# Patient Record
Sex: MALE | Race: WHITE | Employment: PART TIME | URBAN - METROPOLITAN AREA
[De-identification: names, ages, dates, MRNs, and addresses within clinical notes are randomized per-mention and may not be internally consistent; named-entity substitution may affect disease eponyms.]

---

## 2021-07-09 ENCOUNTER — HOSPITAL ENCOUNTER (EMERGENCY)
Age: 51
Discharge: HOME | End: 2021-07-09
Payer: COMMERCIAL

## 2021-07-09 ENCOUNTER — HOSPITAL ENCOUNTER (EMERGENCY)
Age: 51
Discharge: LEFT BEFORE DISCHARGE | End: 2021-07-09
Payer: COMMERCIAL

## 2021-07-09 VITALS
RESPIRATION RATE: 16 BRPM | SYSTOLIC BLOOD PRESSURE: 151 MMHG | DIASTOLIC BLOOD PRESSURE: 84 MMHG | TEMPERATURE: 97.2 F | BODY MASS INDEX: 26.52 KG/M2 | HEART RATE: 76 BPM | WEIGHT: 175 LBS | OXYGEN SATURATION: 99 % | HEIGHT: 68 IN

## 2021-07-09 VITALS
DIASTOLIC BLOOD PRESSURE: 78 MMHG | HEIGHT: 68 IN | OXYGEN SATURATION: 97 % | TEMPERATURE: 98.8 F | SYSTOLIC BLOOD PRESSURE: 136 MMHG | RESPIRATION RATE: 16 BRPM | WEIGHT: 175 LBS | HEART RATE: 86 BPM | BODY MASS INDEX: 26.52 KG/M2
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
## 2021-07-09 DIAGNOSIS — R03.0 ELEVATED BLOOD PRESSURE READING: ICD-10-CM

## 2021-07-09 PROCEDURE — 99282 EMERGENCY DEPT VISIT SF MDM: CPT

## 2021-07-09 PROCEDURE — 87390 HIV-1 AG IA: CPT

## 2021-07-09 PROCEDURE — 86704 HEP B CORE ANTIBODY TOTAL: CPT

## 2021-07-09 PROCEDURE — 86702 HIV-2 ANTIBODY: CPT

## 2021-07-09 PROCEDURE — 90471 IMMUNIZATION ADMIN: CPT | Performed by: PHYSICIAN ASSISTANT

## 2021-07-09 PROCEDURE — 90740 HEPB VACC 3 DOSE IMMUNSUP IM: CPT | Performed by: PHYSICIAN ASSISTANT

## 2021-07-09 PROCEDURE — 86701 HIV-1ANTIBODY: CPT

## 2021-07-09 PROCEDURE — 6360000002 HC RX W HCPCS: Performed by: PHYSICIAN ASSISTANT

## 2021-07-09 PROCEDURE — 96372 THER/PROPH/DIAG INJ SC/IM: CPT

## 2021-07-09 PROCEDURE — G0010 ADMIN HEPATITIS B VACCINE: HCPCS | Performed by: PHYSICIAN ASSISTANT

## 2021-07-09 PROCEDURE — 87340 HEPATITIS B SURFACE AG IA: CPT

## 2021-07-09 PROCEDURE — 86706 HEP B SURFACE ANTIBODY: CPT

## 2021-07-09 PROCEDURE — 86803 HEPATITIS C AB TEST: CPT

## 2021-07-09 PROCEDURE — 90715 TDAP VACCINE 7 YRS/> IM: CPT | Performed by: PHYSICIAN ASSISTANT

## 2021-07-09 RX ADMIN — TETANUS TOXOID, REDUCED DIPHTHERIA TOXOID AND ACELLULAR PERTUSSIS VACCINE, ADSORBED 0.5 ML: 5; 2.5; 8; 8; 2.5 SUSPENSION INTRAMUSCULAR at 20:03

## 2021-07-09 RX ADMIN — HEPATITIS B VACCINE (RECOMBINANT) 10 MCG: 20 INJECTION, SUSPENSION INTRAMUSCULAR at 20:04

## 2021-07-09 ASSESSMENT — PAIN DESCRIPTION - PAIN TYPE: TYPE: ACUTE PAIN

## 2021-07-09 ASSESSMENT — PAIN DESCRIPTION - ORIENTATION: ORIENTATION: LEFT

## 2021-07-09 ASSESSMENT — PAIN SCALES - GENERAL: PAINLEVEL_OUTOF10: 1

## 2021-07-09 ASSESSMENT — PAIN DESCRIPTION - LOCATION: LOCATION: FINGER (COMMENT WHICH ONE)

## 2021-07-09 NOTE — ED NOTES
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

## 2021-07-09 NOTE — ED PROVIDER NOTES
[address] ED  EMERGENCY DEPARTMENT ENCOUNTER        Pt Name: Suzy Thomason  MRN: [id number]  [place] 1970  Date of evaluation: 7/9/2021  Provider: Latonia Joshi PA-C  PCP: No primary care provider on file. Shared Visit or Autonomous Visit: REBEKAH. I have evaluated this patient. My supervising physician was available for consultation. CHIEF COMPLAINT       Chief Complaint   Patient presents with    Body Fluid Exposure     Pt reports sustained accidental needle exposure while working at [employer]. Pt reports left [facility] due to wait time. HISTORY OF PRESENT ILLNESS   (Location/Symptom, Timing/Onset, Context/Setting, Quality, Duration, Modifying Factors, Severity)  Note limiting factors. Suzy Thomason is a 49 y.o. male presenting to the emergency department for evaluation of needlestick injury. He was working at the [employer] when he lifted something up and was accidentally poked into the left index finger he had 2 pairs of gloves on at the time went through his finger pad. Did not see blood on the needle. Unsure how long the needle was there but believes things had been there for several months. Injury occurred at 1 PM today. He went to [facility] states he waited in the lobby for hours before leaving and came here. Needs tetanus updated. States he had hepatitis B vaccines many years ago. Concern for infection. The history is provided by the patient. Hand Problem  Location:  Finger  Finger location:  L index finger  Injury: yes    Pain details:     Onset quality:  Sudden  Foreign body present:  No foreign bodies  Tetanus status:  Out of date  Associated symptoms: no decreased range of motion, no fever and no numbness          Nursing Notes were reviewed    REVIEW OF SYSTEMS    (2-9 systems for level 4, 10 or more for level 5)     Review of Systems   Constitutional: Negative for fever. Skin: Positive for wound.  Negative for color change. Neurological: Negative for weakness and numbness. Positives and Pertinent negatives as per HPI. PAST MEDICAL HISTORY   History reviewed. No pertinent past medical history. SURGICAL HISTORY   History reviewed. No pertinent surgical history. CURRENTMEDICATIONS       There are no discharge medications for this patient. ALLERGIES     Patient has no known allergies. FAMILYHISTORY     History reviewed. No pertinent family history. SOCIAL HISTORY       Social History     Socioeconomic History    Marital status:      Spouse name: None    Number of children: None    Years of education: None    Highest education level: None   Occupational History    None   Tobacco Use    Smoking status: Current Every Day Smoker     Types: Cigarettes    Smokeless tobacco: Never Used   Substance and Sexual Activity    Alcohol use: Not Currently    Drug use: Yes     Types: Marijuana    Sexual activity: None   Other Topics Concern    None   Social History Narrative    None     Social Determinants of Health     Financial Resource Strain:     Difficulty of Paying Living Expenses:    Food Insecurity:     Worried About Running Out of Food in the Last Year:     Ran Out of Food in the Last Year:    Transportation Needs:     Lack of Transportation (Medical):      Lack of Transportation (Non-Medical):    Physical Activity:     Days of Exercise per Week:     Minutes of Exercise per Session:    Stress:     Feeling of Stress :    Social Connections:     Frequency of Communication with Friends and Family:     Frequency of Social Gatherings with Friends and Family:     Attends Rastafarian Services:     Active Member of Clubs or Organizations:     Attends Club or Organization Meetings:     Marital Status:    Intimate Partner Violence:     Fear of Current or Ex-Partner:     Emotionally Abused:     Physically Abused:     Sexually Abused:        SCREENINGS             PHYSICAL EXAM (up to 7 for level 4, 8 or more for level 5)     ED Triage Vitals [07/09/21 1857]   BP Temp Temp Source Pulse Resp SpO2 Height Weight   (!) 144/95 97.2 °F (36.2 °C) Oral 78 16 98 % 5' 8\" (1.727 m) 175 lb (79.4 kg)       Physical Exam  Vitals and nursing note reviewed. Constitutional:       Appearance: He is well-developed. He is not ill-appearing or toxic-appearing. HENT:      Head: Normocephalic and atraumatic. Cardiovascular:      Pulses: Normal pulses. Pulmonary:      Effort: Pulmonary effort is normal.   Musculoskeletal:      Comments: Tiny puncture wound left index finger, no active bleeding, no redness or swelling, intact sensation, cap refill less 2 sec. Skin:     General: Skin is warm and dry. Capillary Refill: Capillary refill takes less than 2 seconds. Neurological:      Mental Status: He is alert and oriented to person, place, and time. Sensory: Sensation is intact. Motor: Motor function is intact. No abnormal muscle tone. Psychiatric:         Behavior: Behavior normal.         DIAGNOSTIC RESULTS   LABS:    Labs Reviewed   HEPATITIS B SURFACE ANTIBODY CLIENT   HEPATITIS C ANTIBODY CLIENT   HIV SIGNIFICANT EXPOSURE CLIENT   HEPATITIS B SURFACE ANTIGEN   HEPATITIS B CORE ANTIBODY, TOTAL       All other labs were within normal range or not returned as of this dictation. EKG: All EKG's are interpreted by the Emergency Department Physician in the absence of a cardiologist.  Please see their note for interpretation of EKG. RADIOLOGY:   Non-plain film images such as CT, Ultrasound and MRI are read by the radiologist. Plain radiographic images are visualized andpreliminarily interpreted by the  ED Provider with the below findings:        Interpretation [facility] Radiologist below, if available at the time of this note:    No orders to display     No results found.         PROCEDURES   Unless otherwise noted below, none     Procedures    CRITICAL CARE TIME medications for this patient. DISCONTINUED MEDICATIONS:  There are no discharge medications for this patient.              (Please note that portions ofthis note were completed with a voice recognition program.  Efforts were made to edit the dictations but occasionally words are mis-transcribed.)    Aleah Perez PA-C (electronically signed)            Meghana Umanzor PA-C  07/10/21 8063

## 2021-07-09 NOTE — ED NOTES
Bed: T2  Expected date:   Expected time:   Means of arrival:   Comments:  Body Fluid Exposure PT     Shirlene Jefferson RN  07/09/21 5890

## 2021-07-09 NOTE — LETTER
[facility] [place] [facility] ED  [address] [address]  Phone: [phone]               July 13, 2021    Patient: Violeta Chakraborty   YOB: 1970   Date of Visit: 7/9/2021       To Whom It May Concern:    Violeta Chakrbaorty was seen and treated in our emergency department on 7/9/2021. He may return to work on 7/9/2021.       Sincerely,       Rajinder Malik PA-C         Signature:__________________________________

## 2021-07-10 LAB
HBV SURFACE AB TITR SER: <3.5 MIU/ML
HEPATITIS B SURFACE ANTIGEN INTERPRETATION: NORMAL
HEPATITIS C ANTIBODY: NORMAL

## 2021-07-10 ASSESSMENT — ENCOUNTER SYMPTOMS: COLOR CHANGE: 0

## 2021-07-11 LAB
HEPATITIS B CORE TOTAL ANTIBODY: NEGATIVE
HIV AG/AB: NORMAL
HIV ANTIGEN: NORMAL
HIV-1 ANTIBODY: NORMAL
HIV-2 AB: NORMAL

## 2021-09-08 ENCOUNTER — HOSPITAL ENCOUNTER (EMERGENCY)
Age: 51
Discharge: HOME | End: 2021-09-08
Attending: STUDENT IN AN ORGANIZED HEALTH CARE EDUCATION/TRAINING PROGRAM
Payer: COMMERCIAL

## 2021-09-08 VITALS
DIASTOLIC BLOOD PRESSURE: 95 MMHG | OXYGEN SATURATION: 97 % | HEART RATE: 84 BPM | TEMPERATURE: 98.2 F | RESPIRATION RATE: 16 BRPM | SYSTOLIC BLOOD PRESSURE: 138 MMHG

## 2021-09-08 DIAGNOSIS — H10.9 CONJUNCTIVITIS OF LEFT EYE, UNSPECIFIED CONJUNCTIVITIS TYPE: ICD-10-CM

## 2021-09-08 DIAGNOSIS — Z00.8 ENCOUNTER FOR PSYCHOLOGICAL EVALUATION: Primary | ICD-10-CM

## 2021-09-08 LAB
A/G RATIO: 1.3 (ref 1.1–2.2)
ACETAMINOPHEN LEVEL: <5 UG/ML (ref 10–30)
ALBUMIN SERPL-MCNC: 4.4 G/DL (ref 3.4–5)
ALP BLD-CCNC: 84 U/L (ref 40–129)
ALT SERPL-CCNC: 10 U/L (ref 10–40)
AMPHETAMINE SCREEN, URINE: ABNORMAL
ANION GAP SERPL CALCULATED.3IONS-SCNC: 13 MMOL/L (ref 3–16)
AST SERPL-CCNC: 13 U/L (ref 15–37)
BARBITURATE SCREEN URINE: ABNORMAL
BASOPHILS ABSOLUTE: 0.1 K/UL (ref 0–0.2)
BASOPHILS RELATIVE PERCENT: 0.5 %
BENZODIAZEPINE SCREEN, URINE: ABNORMAL
BILIRUB SERPL-MCNC: 0.3 MG/DL (ref 0–1)
BUN BLDV-MCNC: 14 MG/DL (ref 7–20)
CALCIUM SERPL-MCNC: 9.7 MG/DL (ref 8.3–10.6)
CANNABINOID SCREEN URINE: POSITIVE
CHLORIDE BLD-SCNC: 103 MMOL/L (ref 99–110)
CO2: 24 MMOL/L (ref 21–32)
COCAINE METABOLITE SCREEN URINE: ABNORMAL
CREAT SERPL-MCNC: 1 MG/DL (ref 0.9–1.3)
EOSINOPHILS ABSOLUTE: 0 K/UL (ref 0–0.6)
EOSINOPHILS RELATIVE PERCENT: 0.2 %
ETHANOL: NORMAL MG/DL (ref 0–0.08)
GFR AFRICAN AMERICAN: >60
GFR NON-AFRICAN AMERICAN: >60
GLOBULIN: 3.3 G/DL
GLUCOSE BLD-MCNC: 95 MG/DL (ref 70–99)
HCT VFR BLD CALC: 47.3 % (ref 40.5–52.5)
HEMOGLOBIN: 16.3 G/DL (ref 13.5–17.5)
LYMPHOCYTES ABSOLUTE: 1.9 K/UL (ref 1–5.1)
LYMPHOCYTES RELATIVE PERCENT: 17.6 %
Lab: ABNORMAL
MCH RBC QN AUTO: 32.7 PG (ref 26–34)
MCHC RBC AUTO-ENTMCNC: 34.4 G/DL (ref 31–36)
MCV RBC AUTO: 95.1 FL (ref 80–100)
METHADONE SCREEN, URINE: ABNORMAL
MONOCYTES ABSOLUTE: 0.7 K/UL (ref 0–1.3)
MONOCYTES RELATIVE PERCENT: 6.2 %
NEUTROPHILS ABSOLUTE: 8.1 K/UL (ref 1.7–7.7)
NEUTROPHILS RELATIVE PERCENT: 75.5 %
OPIATE SCREEN URINE: ABNORMAL
OXYCODONE URINE: ABNORMAL
PDW BLD-RTO: 13.9 % (ref 12.4–15.4)
PH UA: 5
PHENCYCLIDINE SCREEN URINE: ABNORMAL
PLATELET # BLD: 235 K/UL (ref 135–450)
PMV BLD AUTO: 7.4 FL (ref 5–10.5)
POTASSIUM REFLEX MAGNESIUM: 4.2 MMOL/L (ref 3.5–5.1)
PROPOXYPHENE SCREEN: ABNORMAL
RBC # BLD: 4.97 M/UL (ref 4.2–5.9)
SALICYLATE, SERUM: <0.3 MG/DL (ref 15–30)
SARS-COV-2, NAAT: NOT DETECTED
SODIUM BLD-SCNC: 140 MMOL/L (ref 136–145)
TOTAL PROTEIN: 7.7 G/DL (ref 6.4–8.2)
WBC # BLD: 10.7 K/UL (ref 4–11)

## 2021-09-08 PROCEDURE — 87635 SARS-COV-2 COVID-19 AMP PRB: CPT

## 2021-09-08 PROCEDURE — 80053 COMPREHEN METABOLIC PANEL: CPT

## 2021-09-08 PROCEDURE — 80143 DRUG ASSAY ACETAMINOPHEN: CPT

## 2021-09-08 PROCEDURE — 99284 EMERGENCY DEPT VISIT MOD MDM: CPT

## 2021-09-08 PROCEDURE — 82077 ASSAY SPEC XCP UR&BREATH IA: CPT

## 2021-09-08 PROCEDURE — 80179 DRUG ASSAY SALICYLATE: CPT

## 2021-09-08 PROCEDURE — 80307 DRUG TEST PRSMV CHEM ANLYZR: CPT

## 2021-09-08 PROCEDURE — 85025 COMPLETE CBC W/AUTO DIFF WBC: CPT

## 2021-09-08 RX ORDER — ERYTHROMYCIN 5 MG/G
OINTMENT OPHTHALMIC EVERY 6 HOURS
Qty: 1 EACH | Refills: 0 | Status: SHIPPED | OUTPATIENT
Start: 2021-09-08 | End: 2021-09-15

## 2021-09-08 ASSESSMENT — ENCOUNTER SYMPTOMS
NAUSEA: 0
DIARRHEA: 0
BACK PAIN: 0
SORE THROAT: 0
EYE DISCHARGE: 1
COUGH: 0
CONSTIPATION: 0
ABDOMINAL PAIN: 0
SHORTNESS OF BREATH: 0
VOMITING: 0
PHOTOPHOBIA: 0
RHINORRHEA: 0

## 2021-09-08 NOTE — ED NOTES
Pt brought back to [facility] - changed into blue gown - belongings locked in locker - patient shown to treatment room B1     Praneeth Henry RN  09/08/21 6814

## 2021-09-08 NOTE — ED PROVIDER NOTES
[address] ED  EMERGENCY DEPARTMENT ENCOUNTER      Pt Name: Marcela Vallejo  MRN: [id number]  [place] 1970  Date of evaluation: 9/8/2021  Provider: Xavier Sabillon DO    CHIEF COMPLAINT       Chief Complaint   Patient presents with    Psychiatric Evaluation     Anxiety and Depression have increased lately and is wanting help for it         HISTORY OF PRESENT ILLNESS   (Location/Symptom, Timing/Onset, Context/Setting, Quality, Duration, Modifying Factors, Severity)  Note limiting factors. Marcela Vallejo is a 49 y.o. male who presents to the emergency department complaining of presents for evaluation for anxiety depression. Patient tells a longstanding history of same, not premedicated previously followed with psychiatry but due to moved in circumstance he no longer sees anyone. Increased over the weekend due to break-up with significant other. Is seeking help today. Does report thoughts of suicide without plan. He states that when he begins to have these thoughts he thinks about his children. Otherwise patient has no other complaints however I did note that patient had purulent drainage from left eye, does have chronic appearing swelling to the lower eyelid with drainage reports is been there for 6 years. No visual loss, no new issues patient does not appear concerned with this. Nursing Notes were reviewed. PAST MEDICAL HISTORY   History reviewed. No pertinent past medical history. SURGICAL HISTORY     History reviewed. No pertinent surgical history. CURRENT MEDICATIONS       Previous Medications    No medications on file       ALLERGIES     Patient has no known allergies. FAMILY HISTORY     History reviewed. No pertinent family history.        SOCIAL HISTORY       Social History     Socioeconomic History    Marital status:      Spouse name: None    Number of children: None    Years of education: None    Highest education level: None   Occupational History    None   Tobacco Use    Smoking status: Current Every Day Smoker     Types: Cigarettes    Smokeless tobacco: Never Used   Vaping Use    Vaping Use: Never used   Substance and Sexual Activity    Alcohol use: Not Currently    Drug use: Yes     Types: Marijuana    Sexual activity: Not Currently   Other Topics Concern    None   Social History Narrative    None     Social Determinants of Health     Financial Resource Strain:     Difficulty of Paying Living Expenses:    Food Insecurity:     Worried About Running Out of Food in the Last Year:     Ran Out of Food in the Last Year:    Transportation Needs:     Lack of Transportation (Medical):  Lack of Transportation (Non-Medical):    Physical Activity:     Days of Exercise per Week:     Minutes of Exercise per Session:    Stress:     Feeling of Stress :    Social Connections:     Frequency of Communication with Friends and Family:     Frequency of Social Gatherings with Friends and Family:     Attends Buddhist Services:     Active Member of Clubs or Organizations:     Attends Club or Organization Meetings:     Marital Status:    Intimate Partner Violence:     Fear of Current or Ex-Partner:     Emotionally Abused:     Physically Abused:     Sexually Abused:        SCREENINGS                            REVIEW OF SYSTEMS    (2-9 systems for level 4, 10 or more for level 5)   Review of Systems   Constitutional: Negative for chills, fatigue and fever. HENT: Negative for congestion, rhinorrhea and sore throat. Eyes: Positive for discharge. Negative for photophobia and visual disturbance. Respiratory: Negative for cough and shortness of breath. Cardiovascular: Negative for chest pain and palpitations. Gastrointestinal: Negative for abdominal pain, constipation, diarrhea, nausea and vomiting. Genitourinary: Negative for decreased urine volume. Musculoskeletal: Negative for back pain, neck pain and neck stiffness.    Skin: Negative for rash.   Neurological: Negative for weakness, numbness and headaches. Psychiatric/Behavioral: Positive for suicidal ideas. Negative for confusion. PHYSICAL EXAM    (up to 7 for level 4, 8 or more for level 5)   RECENT VITALS:     Temp: 98.2 °F (36.8 °C),  Pulse: 84, Resp: 16, BP: (!) 138/95, SpO2: 97 %    Physical Exam  Constitutional:       General: He is not in acute distress. Appearance: He is not diaphoretic. HENT:      Head: Normocephalic and atraumatic. Eyes:      Pupils: Pupils are equal, round, and reactive to light. Comments: Conjunctive is normal, does have purulent drainage in corner of left eye. There is thickened tissue to the left eyelid. Chronic. Neck:      Trachea: No tracheal deviation. Cardiovascular:      Rate and Rhythm: Normal rate and regular rhythm. Pulmonary:      Effort: Pulmonary effort is normal. No respiratory distress. Breath sounds: No stridor. No wheezing. Abdominal:      General: There is no distension. Palpations: Abdomen is soft. Tenderness: There is no abdominal tenderness. Musculoskeletal:         General: No deformity. Normal range of motion. Cervical back: Normal range of motion and neck supple. Skin:     General: Skin is warm and dry. Neurological:      Mental Status: He is alert and oriented to person, place, and time. DIAGNOSTIC RESULTS     EKG: All EKG's are interpreted by the Emergency Department Physician who either signs or Co-signs this chart in the absence of a cardiologist.    RADIOLOGY:   Non-plain film images such as CT, Ultrasound and MRI are read by the radiologist. Plain radiographic images are visualized and preliminarily interpreted by the emergency physician.     Interpretation per the Radiologist below, if available at the time of this note:    No orders to display         LABS:  Labs Reviewed   ACETAMINOPHEN LEVEL - Abnormal; Notable for the following components:       Result Value Acetaminophen Level <5 (*)     All other components within normal limits    Narrative:     Performed at:  [facility],  [place], Dress Code   Phone [phone]   CBC WITH AUTO DIFFERENTIAL - Abnormal; Notable for the following components:    Neutrophils Absolute 8.1 (*)     All other components within normal limits    Narrative:     Performed at:  [facility] [address],  [place], Dress Code   Phone [phone]   COMPREHENSIVE METABOLIC PANEL W/ REFLEX TO MG FOR LOW K - Abnormal; Notable for the following components:    AST 13 (*)     All other components within normal limits    Narrative:     Performed at:  [facility] [address],  [place], Dress Code   Phone [phone]   [address] - Abnormal; Notable for the following components:    Cannabinoid Scrn, Ur POSITIVE (*)     All other components within normal limits    Narrative:     Performed at:  [facility],  [place], Dress Code   Phone [phone]   SALICYLATE LEVEL - Abnormal; Notable for the following components:    Salicylate, Serum <9.3 (*)     All other components within normal limits    Narrative:     Performed at:  [facility] [address],  [place], Dress Code   Phone [phone], RAPID    Narrative:     Performed at:  [facility] [address],  [place], Dress Code   Phone [phone]   ETHANOL    Narrative:     Performed at:  [facility],  [place], Dress Code   Phone [phone]       All other labs were within normal range or not returned as of this dictation.     EMERGENCY DEPARTMENT COURSE and DIFFERENTIAL DIAGNOSIS/MDM:   Netta Broussard is a 49 y.o. male who presents to the emergency department with the complaint of psychiatric valuation complains of increasing anxiety, depression worsened over the weekend due to a break-up in relationship. Vitals stable on arrival normal labs. Of note patient was noted to have a discharge from left eye patient does not seem concerned about this but I did recommend we begin erythromycin, I do feel patient is appropriate for outpatient follow-up with [address], will give patient referrals. Is a chronic issue over the last 6 years no acute change. No eye pain with movement, pupils equal and reactive, normal conjunctive     Patient medically clear pending psychiatric evaluation    Patient was seen and evaluated by psychiatry service, patient was deemed appropriate for outpatient resources. Patient was given follow-up information for [address] for eye drainage. CRITICAL CARE TIME   Total Critical Care time was 0 minutes, excluding separately reportable procedures. There was a high probability of clinically significant/life threatening deterioration in the patient's condition which required my urgent intervention. Clinical concern   Intervention     CONSULTS:  None    PROCEDURES:  Unless otherwise noted below, none     Procedures        FINAL IMPRESSION      1. Encounter for psychological evaluation    2. Conjunctivitis of left eye, unspecified conjunctivitis type          DISPOSITION/PLAN   DISPOSITION Decision To Discharge 09/08/2021 05:15:01 PM      PATIENT REFERRED TO:  [address] [facility] [address]  [phone]    Schedule an appointment as soon as possible for a visit       [facility]  [address]  [phone]    If symptoms worsen      DISCHARGE MEDICATIONS:  New Prescriptions    ERYTHROMYCIN (ROMYCIN) 5 MG/GM OPHTHALMIC OINTMENT    Place into the left eye every 6 hours for 7 days     Controlled Substances Monitoring:     No flowsheet data found.     (Please note that portions of this note were completed with a voice recognition program.  Efforts were made to edit the dictations but occasionally words are mis-transcribed.)    Iza Castellano DO (electronically signed)  Attending Emergency Physician            Iza Castellano DO  09/08/21 2617

## 2021-09-08 NOTE — ED NOTES
Called [phone]. Spoke to therapist on phone who stated that their phone is open 24/7 to talk to anyone. He also stated that they have walk in times from 830am to 330pm at [facility] for same day visits.      Titus Liang RN  09/08/21 7339

## 2021-09-08 NOTE — ED NOTES
Level of Care Disposition: Discharge  Patient was seen by ED provider and [facility] staff. The case was presented to psychiatric provider on-call Dr. Joleen Anand.   Based on the ED evaluation and information presented to the provider by SAMUEL Greenwood RN , it is the recommendation of the on call psychiatric provider that the patient be discharged from a psychiatric standpoint with the following referrals: crisis line, [facility], [facility]        [facility], RN  09/08/21 8170

## 2021-09-08 NOTE — ED NOTES
Presenting Problem:Patient presents to [facility]] voluntarily after having an increase in his anxiety and depression. Patient was clinically sober at the time of the evaluation. Appearance/Hygiene:  hospital attire, seated in bed, fair grooming and fair hygiene   Motor Behavior: WNL   Attitude: cooperative  Affect: normal affect   Speech: normal pitch and normal volume  Mood: within normal limits   Thought Processes: Goal directed  Perceptions: Absent   Thought content: wants to receive help for his anxiety and depression   Orientation: A&Ox4   Memory: intact  Concentration: Good    Insight/ judgement: limited insight      Psychosocial and contextual factors: Patient currently lives alone. He moved here from [place]. Patient states that he has no one here. His family and his children are back in [place] where he is from. Patient states that he can't go back there because his ex-wife is there. He states that he has no license because it has been taken and his ex has a PTO on him. Patient states that they have been  for 6 years this coming December after being together for 27 years. Patient states that he was supposed to go out with a girl over the weekend but that she cancelled and he felt very alone. He states that his muscles tensed up, his stomach got tight and his sleep was non-existent. Patient states that he hasn't eaten since Saturday due to his anxiety and depression. Patient also states that at times he has suicidal thoughts but no plans and no intent of acting on it as he doesn't want to do that to his children. Patient states that he called [facility] who told him to come up here to an be evaluated so that he can be seen by them quicker. Patient currently denies SI/HI/AVH. C-SSRS flowsheet is  Complete. Psychiatric History (including current outpatient provider and past inpatient admissions):  Was in therapy in [place] over 9 years ago    Access to Firearms: Denies    ASSESSMENT FOR IMMINENT FUTURE DANGER:    RISK FACTORS:    []  Age <25 or >49   [x]  Male gender   [x]  Depressed mood   []  Active suicidal ideation   []  Suicide plan   []  Suicide attempt   []  Access to lethal means   []  Prior suicide attempt   [x]  Active substance abuse (if yes pleases add details Leonardo Lopez)   []  Highly impulsive behaviors   []  Not attending to self-care/ADLs    []  Recent significant loss   []  Chronic pain or medical illness   [x]  Social isolation   []  History of violence (if yes please elaborate )   []  Active psychosis   []  Cognitive impairment    [x]  No outpatient services in place   []  Medication noncompliance   []  No collateral information to support safety  [] Self- injurious/ Self-harm behavior    PROTECTIVE FACTORS:  [x] Age >25 and <55  [] Female gender   [] Denies depression  [x] Denies suicidal ideation  [x] Does not have lethal plan   [x] Does not have access to guns or weapons  [x] Patient is verbally ronda for safety  [x] No prior suicide attempts  [] No active substance abuse  [] Patient has social or family support  [x] No active psychosis or cognitive dysfunction  [] Physically healthy  [] Has outpatient services in place  [] Compliant with recommended medications  [] Collateral information from  supports patient safety   [] Patient is future oriented with plans to get into therapy at [facility] [address] [facility]  09/08/21 6218

## 2022-04-18 ENCOUNTER — TELEPHONE (OUTPATIENT)
Dept: CASE MANAGEMENT | Age: 52
End: 2022-04-18

## 2022-04-18 NOTE — TELEPHONE ENCOUNTER
Left VM to return call and verify smoking hx prior to Lung Screening.     Thank you,  Delmi Ludwig Lung Navigator  [phone]

## 2022-04-21 ENCOUNTER — HOSPITAL ENCOUNTER (OUTPATIENT)
Dept: GENERAL RADIOLOGY | Age: 52
Discharge: HOME | End: 2022-04-21
Payer: COMMERCIAL

## 2022-04-21 ENCOUNTER — HOSPITAL ENCOUNTER (OUTPATIENT)
Age: 52
Discharge: HOME | End: 2022-04-21
Payer: COMMERCIAL

## 2022-04-21 ENCOUNTER — HOSPITAL ENCOUNTER (OUTPATIENT)
Dept: CT IMAGING | Age: 52
Discharge: HOME | End: 2022-04-21
Payer: COMMERCIAL

## 2022-04-21 ENCOUNTER — APPOINTMENT (OUTPATIENT)
Dept: CT IMAGING | Age: 52
End: 2022-04-21
Payer: COMMERCIAL

## 2022-04-21 DIAGNOSIS — Z12.2 ENCOUNTER FOR SCREENING FOR MALIGNANT NEOPLASM OF RESPIRATORY ORGANS: ICD-10-CM

## 2022-04-21 DIAGNOSIS — R59.1 LYMPHADENOPATHY: ICD-10-CM

## 2022-04-21 DIAGNOSIS — F17.210 CIGARETTE SMOKER: ICD-10-CM

## 2022-04-21 DIAGNOSIS — M54.50 CHRONIC MIDLINE LOW BACK PAIN, UNSPECIFIED WHETHER SCIATICA PRESENT: ICD-10-CM

## 2022-04-21 DIAGNOSIS — G89.29 CHRONIC MIDLINE LOW BACK PAIN, UNSPECIFIED WHETHER SCIATICA PRESENT: ICD-10-CM

## 2022-04-21 PROCEDURE — 71271 CT THORAX LUNG CANCER SCR C-: CPT

## 2022-04-21 PROCEDURE — 72100 X-RAY EXAM L-S SPINE 2/3 VWS: CPT

## 2022-04-24 ENCOUNTER — TELEPHONE (OUTPATIENT)
Dept: CASE MANAGEMENT | Age: 52
End: 2022-04-24

## 2022-04-24 NOTE — TELEPHONE ENCOUNTER
Annual Lung Cancer Screening CT on 4/21/2022. LRAD1. Recommended screen in one year. Results letter mailed.     Thank you,  Alvaro Mosqueda RN  [facility] Lung Navigator  [phone]

## 2022-05-11 ENCOUNTER — HOSPITAL ENCOUNTER (OUTPATIENT)
Dept: CT IMAGING | Age: 52
Discharge: HOME | End: 2022-05-11
Payer: COMMERCIAL

## 2022-05-11 DIAGNOSIS — R59.1 LYMPHADENOPATHY: ICD-10-CM

## 2022-05-11 PROCEDURE — 6360000004 HC RX CONTRAST MEDICATION: Performed by: PHYSICIAN ASSISTANT

## 2022-05-11 PROCEDURE — 70491 CT SOFT TISSUE NECK W/DYE: CPT

## 2022-05-11 RX ADMIN — IOPAMIDOL 75 ML: 755 INJECTION, SOLUTION INTRAVENOUS at 13:08

## 2023-05-24 ENCOUNTER — TELEPHONE (OUTPATIENT)
Dept: TELEMETRY | Age: 53
End: 2023-05-24

## 2023-05-24 NOTE — TELEPHONE ENCOUNTER
Patient due for annual CT Lung Screening. Reminder letter mailed. Routed to PCP, DIEGO Nix with [facility].     Denise Ortega RN

## 2023-06-22 ENCOUNTER — TELEPHONE (OUTPATIENT)
Dept: TELEMETRY | Age: 53
End: 2023-06-22

## 2023-06-22 NOTE — TELEPHONE ENCOUNTER
Patient due for annual CT Lung Screening. Reminder letter mailed. Routed to PCP, DIEGO Issa with [facility].     Gali Salmeron RN

## 2024-01-11 ENCOUNTER — OFFICE VISIT (OUTPATIENT)
Dept: ORTHOPEDIC SURGERY | Age: 54
End: 2024-01-11
Payer: COMMERCIAL

## 2024-01-11 VITALS — WEIGHT: 200 LBS | BODY MASS INDEX: 30.31 KG/M2 | HEIGHT: 68 IN

## 2024-01-11 DIAGNOSIS — M54.2 CERVICAL PAIN: Primary | ICD-10-CM

## 2024-01-11 DIAGNOSIS — M47.816 LUMBAR SPONDYLOSIS: ICD-10-CM

## 2024-01-11 DIAGNOSIS — M47.812 CERVICAL SPONDYLOSIS: ICD-10-CM

## 2024-01-11 DIAGNOSIS — M54.50 LUMBAR PAIN: ICD-10-CM

## 2024-01-11 PROCEDURE — 4004F PT TOBACCO SCREEN RCVD TLK: CPT | Performed by: ORTHOPAEDIC SURGERY

## 2024-01-11 PROCEDURE — G8484 FLU IMMUNIZE NO ADMIN: HCPCS | Performed by: ORTHOPAEDIC SURGERY

## 2024-01-11 PROCEDURE — G8427 DOCREV CUR MEDS BY ELIG CLIN: HCPCS | Performed by: ORTHOPAEDIC SURGERY

## 2024-01-11 PROCEDURE — 3017F COLORECTAL CA SCREEN DOC REV: CPT | Performed by: ORTHOPAEDIC SURGERY

## 2024-01-11 PROCEDURE — 99203 OFFICE O/P NEW LOW 30 MIN: CPT | Performed by: ORTHOPAEDIC SURGERY

## 2024-01-11 PROCEDURE — G8419 CALC BMI OUT NRM PARAM NOF/U: HCPCS | Performed by: ORTHOPAEDIC SURGERY

## 2024-01-11 RX ORDER — ARIPIPRAZOLE 10 MG/1
1 TABLET ORAL DAILY
COMMUNITY

## 2024-01-11 RX ORDER — TRAZODONE HYDROCHLORIDE 50 MG/1
TABLET ORAL
COMMUNITY
Start: 2023-12-07

## 2024-01-11 RX ORDER — ATORVASTATIN CALCIUM 40 MG/1
1 TABLET, FILM COATED ORAL DAILY
COMMUNITY
Start: 2023-12-07

## 2024-01-11 RX ORDER — MULTIVITAMIN
TABLET ORAL
COMMUNITY
Start: 2024-01-10

## 2024-01-11 NOTE — PROGRESS NOTES
New Patient: LUMBAR SPINE    Referring Provider:  No ref. provider found    CHIEF COMPLAINT:    Chief Complaint   Patient presents with    Back Pain     LUMBAR       HISTORY OF PRESENT ILLNESS:    Mr. Casper Loja  is a pleasant 53 y.o. male presents today for evaluation of chronic neck and low back pain.  He rates his back pain 7/10 in his neck and bilateral trap pain 4/10.  He reports numbness and tingling of his feet.  He denies saddle anesthesia and bowel or bladder dysfunction.      Current/Past Treatment:   Physical Therapy: no  Chiropractic:  no   Injection:  no   Medications:  none     Past Medical History:   I reviewed past medical history which is noncontributory to his present illness  Past Surgical History:     I reviewed past surgical history which is noncontributory to his present illness  Current Medications:     Current Outpatient Medications:     Multiple Vitamin (MULTIVITAMIN) TABS, , Disp: , Rfl:     atorvastatin (LIPITOR) 40 MG tablet, Take 1 tablet by mouth daily, Disp: , Rfl:     traZODone (DESYREL) 50 MG tablet, , Disp: , Rfl:     ARIPiprazole (ABILIFY) 10 MG tablet, Take 1 tablet by mouth daily, Disp: , Rfl:   Allergies:  Patient has no known allergies.  Social History:    reports that he has been smoking cigarettes. He has a 31.0 pack-year smoking history. He has never used smokeless tobacco. He reports that he does not currently use alcohol. He reports current drug use. Drug: Marijuana (Weed).  Family History:   No family history on file.    REVIEW OF SYSTEMS: Full ROS noted & scanned   CONSTITUTIONAL: Denies unexplained weight loss, fevers, chills or fatigue  NEUROLOGICAL: Denies unsteady gait or progressive weakness  MUSCULOSKELETAL: Denies joint swelling or redness  PSYCHOLOGICAL: Denies anxiety, depression   SKIN: Denies skin changes, delayed healing, rash, itching   HEMATOLOGIC: Denies easy bleeding or bruising  ENDOCRINE: Denies excessive thirst, urination, heat/cold  RESPIRATORY:

## 2024-11-21 ENCOUNTER — TELEPHONE (OUTPATIENT)
Dept: TELEMETRY | Age: 54
End: 2024-11-21

## 2025-01-30 ENCOUNTER — TELEPHONE (OUTPATIENT)
Dept: TELEMETRY | Age: 55
End: 2025-01-30

## 2025-01-30 NOTE — TELEPHONE ENCOUNTER
Patient due for annual CT Lung Screening. Reminder letter mailed.    Routed to PCP, DIEGO Pastor RN

## 2025-02-27 ENCOUNTER — TELEPHONE (OUTPATIENT)
Dept: TELEMETRY | Age: 55
End: 2025-02-27

## 2025-02-27 NOTE — TELEPHONE ENCOUNTER
Pt due for Annual CT Lung Screening, reminder letter mailed, Central Scheduling phone number provided.    Routed to PCP, DIEGO Pastor. Final notice.    Sabine Linda RN